# Patient Record
Sex: FEMALE | Race: ASIAN | NOT HISPANIC OR LATINO | URBAN - METROPOLITAN AREA
[De-identification: names, ages, dates, MRNs, and addresses within clinical notes are randomized per-mention and may not be internally consistent; named-entity substitution may affect disease eponyms.]

---

## 2018-10-06 ENCOUNTER — EMERGENCY (EMERGENCY)
Facility: HOSPITAL | Age: 58
LOS: 1 days | Discharge: HOME | End: 2018-10-06
Attending: EMERGENCY MEDICINE
Payer: COMMERCIAL

## 2018-10-06 VITALS
OXYGEN SATURATION: 97 % | SYSTOLIC BLOOD PRESSURE: 114 MMHG | DIASTOLIC BLOOD PRESSURE: 75 MMHG | TEMPERATURE: 99 F | RESPIRATION RATE: 17 BRPM | HEART RATE: 85 BPM | WEIGHT: 149.91 LBS | HEIGHT: 64 IN

## 2018-10-06 VITALS
HEART RATE: 68 BPM | TEMPERATURE: 99 F | RESPIRATION RATE: 17 BRPM | SYSTOLIC BLOOD PRESSURE: 99 MMHG | OXYGEN SATURATION: 98 % | DIASTOLIC BLOOD PRESSURE: 85 MMHG

## 2018-10-06 DIAGNOSIS — Z98.89 OTHER SPECIFIED POSTPROCEDURAL STATES: Chronic | ICD-10-CM

## 2018-10-06 DIAGNOSIS — Z98.51 TUBAL LIGATION STATUS: Chronic | ICD-10-CM

## 2018-10-06 LAB
ALBUMIN SERPL ELPH-MCNC: 3.6 G/DL — SIGNIFICANT CHANGE UP (ref 3.3–5)
ALP SERPL-CCNC: 77 U/L — SIGNIFICANT CHANGE UP (ref 40–120)
ALT FLD-CCNC: 46 U/L — HIGH (ref 10–45)
ANION GAP SERPL CALC-SCNC: 11 MMOL/L — SIGNIFICANT CHANGE UP (ref 5–17)
APPEARANCE UR: CLEAR — SIGNIFICANT CHANGE UP
AST SERPL-CCNC: 31 U/L — SIGNIFICANT CHANGE UP (ref 10–40)
BACTERIA # UR AUTO: NEGATIVE — SIGNIFICANT CHANGE UP
BASOPHILS # BLD AUTO: 0 K/UL — SIGNIFICANT CHANGE UP (ref 0–0.2)
BASOPHILS NFR BLD AUTO: 0.7 % — SIGNIFICANT CHANGE UP (ref 0–2)
BILIRUB SERPL-MCNC: 0.3 MG/DL — SIGNIFICANT CHANGE UP (ref 0.2–1.2)
BILIRUB UR-MCNC: NEGATIVE — SIGNIFICANT CHANGE UP
BUN SERPL-MCNC: 10 MG/DL — SIGNIFICANT CHANGE UP (ref 7–23)
CALCIUM SERPL-MCNC: 9.3 MG/DL — SIGNIFICANT CHANGE UP (ref 8.4–10.5)
CHLORIDE SERPL-SCNC: 101 MMOL/L — SIGNIFICANT CHANGE UP (ref 96–108)
CO2 SERPL-SCNC: 23 MMOL/L — SIGNIFICANT CHANGE UP (ref 22–31)
COLOR SPEC: SIGNIFICANT CHANGE UP
CREAT SERPL-MCNC: 0.84 MG/DL — SIGNIFICANT CHANGE UP (ref 0.5–1.3)
DIFF PNL FLD: NEGATIVE — SIGNIFICANT CHANGE UP
EOSINOPHIL # BLD AUTO: 0.3 K/UL — SIGNIFICANT CHANGE UP (ref 0–0.5)
EOSINOPHIL NFR BLD AUTO: 3.8 % — SIGNIFICANT CHANGE UP (ref 0–6)
EPI CELLS # UR: 2 /HPF — SIGNIFICANT CHANGE UP
GAS PNL BLDV: SIGNIFICANT CHANGE UP
GLUCOSE SERPL-MCNC: 90 MG/DL — SIGNIFICANT CHANGE UP (ref 70–99)
GLUCOSE UR QL: NEGATIVE — SIGNIFICANT CHANGE UP
HCT VFR BLD CALC: 28.2 % — LOW (ref 34.5–45)
HGB BLD-MCNC: 9.4 G/DL — LOW (ref 11.5–15.5)
HYALINE CASTS # UR AUTO: 0 /LPF — SIGNIFICANT CHANGE UP (ref 0–2)
KETONES UR-MCNC: NEGATIVE — SIGNIFICANT CHANGE UP
LEUKOCYTE ESTERASE UR-ACNC: NEGATIVE — SIGNIFICANT CHANGE UP
LYMPHOCYTES # BLD AUTO: 1.6 K/UL — SIGNIFICANT CHANGE UP (ref 1–3.3)
LYMPHOCYTES # BLD AUTO: 21.9 % — SIGNIFICANT CHANGE UP (ref 13–44)
MCHC RBC-ENTMCNC: 32.2 PG — SIGNIFICANT CHANGE UP (ref 27–34)
MCHC RBC-ENTMCNC: 33.3 GM/DL — SIGNIFICANT CHANGE UP (ref 32–36)
MCV RBC AUTO: 96.6 FL — SIGNIFICANT CHANGE UP (ref 80–100)
MONOCYTES # BLD AUTO: 0.7 K/UL — SIGNIFICANT CHANGE UP (ref 0–0.9)
MONOCYTES NFR BLD AUTO: 10.1 % — SIGNIFICANT CHANGE UP (ref 2–14)
NEUTROPHILS # BLD AUTO: 4.6 K/UL — SIGNIFICANT CHANGE UP (ref 1.8–7.4)
NEUTROPHILS NFR BLD AUTO: 63.6 % — SIGNIFICANT CHANGE UP (ref 43–77)
NITRITE UR-MCNC: NEGATIVE — SIGNIFICANT CHANGE UP
PH UR: 6.5 — SIGNIFICANT CHANGE UP (ref 5–8)
PLATELET # BLD AUTO: 398 K/UL — SIGNIFICANT CHANGE UP (ref 150–400)
POTASSIUM SERPL-MCNC: 3.9 MMOL/L — SIGNIFICANT CHANGE UP (ref 3.5–5.3)
POTASSIUM SERPL-SCNC: 3.9 MMOL/L — SIGNIFICANT CHANGE UP (ref 3.5–5.3)
PROT SERPL-MCNC: 6.9 G/DL — SIGNIFICANT CHANGE UP (ref 6–8.3)
PROT UR-MCNC: SIGNIFICANT CHANGE UP
RAPID RVP RESULT: SIGNIFICANT CHANGE UP
RBC # BLD: 2.92 M/UL — LOW (ref 3.8–5.2)
RBC # FLD: 13.4 % — SIGNIFICANT CHANGE UP (ref 10.3–14.5)
RBC CASTS # UR COMP ASSIST: 1 /HPF — SIGNIFICANT CHANGE UP (ref 0–4)
SODIUM SERPL-SCNC: 135 MMOL/L — SIGNIFICANT CHANGE UP (ref 135–145)
SP GR SPEC: 1.02 — SIGNIFICANT CHANGE UP (ref 1.01–1.02)
UROBILINOGEN FLD QL: NEGATIVE — SIGNIFICANT CHANGE UP
WBC # BLD: 7.3 K/UL — SIGNIFICANT CHANGE UP (ref 3.8–10.5)
WBC # FLD AUTO: 7.3 K/UL — SIGNIFICANT CHANGE UP (ref 3.8–10.5)
WBC UR QL: 2 /HPF — SIGNIFICANT CHANGE UP (ref 0–5)

## 2018-10-06 PROCEDURE — 71045 X-RAY EXAM CHEST 1 VIEW: CPT | Mod: 26

## 2018-10-06 PROCEDURE — 80053 COMPREHEN METABOLIC PANEL: CPT

## 2018-10-06 PROCEDURE — 84295 ASSAY OF SERUM SODIUM: CPT

## 2018-10-06 PROCEDURE — 99284 EMERGENCY DEPT VISIT MOD MDM: CPT | Mod: 25

## 2018-10-06 PROCEDURE — 87581 M.PNEUMON DNA AMP PROBE: CPT

## 2018-10-06 PROCEDURE — 82435 ASSAY OF BLOOD CHLORIDE: CPT

## 2018-10-06 PROCEDURE — 82947 ASSAY GLUCOSE BLOOD QUANT: CPT

## 2018-10-06 PROCEDURE — 87186 SC STD MICRODIL/AGAR DIL: CPT

## 2018-10-06 PROCEDURE — 99284 EMERGENCY DEPT VISIT MOD MDM: CPT

## 2018-10-06 PROCEDURE — 82330 ASSAY OF CALCIUM: CPT

## 2018-10-06 PROCEDURE — 82803 BLOOD GASES ANY COMBINATION: CPT

## 2018-10-06 PROCEDURE — 81001 URINALYSIS AUTO W/SCOPE: CPT

## 2018-10-06 PROCEDURE — 83605 ASSAY OF LACTIC ACID: CPT

## 2018-10-06 PROCEDURE — 85014 HEMATOCRIT: CPT

## 2018-10-06 PROCEDURE — 84132 ASSAY OF SERUM POTASSIUM: CPT

## 2018-10-06 PROCEDURE — 96375 TX/PRO/DX INJ NEW DRUG ADDON: CPT

## 2018-10-06 PROCEDURE — 85027 COMPLETE CBC AUTOMATED: CPT

## 2018-10-06 PROCEDURE — 96374 THER/PROPH/DIAG INJ IV PUSH: CPT

## 2018-10-06 PROCEDURE — 87633 RESP VIRUS 12-25 TARGETS: CPT

## 2018-10-06 PROCEDURE — 87086 URINE CULTURE/COLONY COUNT: CPT

## 2018-10-06 PROCEDURE — 87040 BLOOD CULTURE FOR BACTERIA: CPT

## 2018-10-06 PROCEDURE — 87486 CHLMYD PNEUM DNA AMP PROBE: CPT

## 2018-10-06 PROCEDURE — 87798 DETECT AGENT NOS DNA AMP: CPT

## 2018-10-06 PROCEDURE — 71045 X-RAY EXAM CHEST 1 VIEW: CPT

## 2018-10-06 RX ORDER — MORPHINE SULFATE 50 MG/1
4 CAPSULE, EXTENDED RELEASE ORAL ONCE
Qty: 0 | Refills: 0 | Status: DISCONTINUED | OUTPATIENT
Start: 2018-10-06 | End: 2018-10-06

## 2018-10-06 RX ORDER — METOCLOPRAMIDE HCL 10 MG
10 TABLET ORAL ONCE
Qty: 0 | Refills: 0 | Status: COMPLETED | OUTPATIENT
Start: 2018-10-06 | End: 2018-10-06

## 2018-10-06 RX ORDER — SODIUM CHLORIDE 9 MG/ML
1000 INJECTION INTRAMUSCULAR; INTRAVENOUS; SUBCUTANEOUS ONCE
Qty: 0 | Refills: 0 | Status: COMPLETED | OUTPATIENT
Start: 2018-10-06 | End: 2018-10-06

## 2018-10-06 RX ADMIN — MORPHINE SULFATE 4 MILLIGRAM(S): 50 CAPSULE, EXTENDED RELEASE ORAL at 12:57

## 2018-10-06 RX ADMIN — Medication 10 MILLIGRAM(S): at 12:46

## 2018-10-06 RX ADMIN — SODIUM CHLORIDE 1000 MILLILITER(S): 9 INJECTION INTRAMUSCULAR; INTRAVENOUS; SUBCUTANEOUS at 12:46

## 2018-10-06 NOTE — ED PROVIDER NOTE - PROGRESS NOTE DETAILS
Patient feel better.  Labs unremarkable.  No Leukocytosis, CXR and Urine normal.  Surgical site CDI, no signs of infection.  Patient afebrile here.  Will DC home to follow up with her surgeon.  -Alan You PA-C Patient feel better.  Labs unremarkable.  No Leukocytosis, CXR and Urine normal.  Surgical site CDI, no signs of infection.  Patient afebrile here.  Wants to go home. Will DC home to follow up with her surgeon.  Return precautions discussed -Alan You PA-C

## 2018-10-06 NOTE — ED PROVIDER NOTE - PLAN OF CARE
1.  Stay well hydrated  2.  Continue current home medications  3.  Follow up with your Neurosurgeon as soon as possible.  4.  Return to the ER for worsening pain, persistent fevers/chills, headache or any other concerning symptoms

## 2018-10-06 NOTE — ED PROVIDER NOTE - MEDICAL DECISION MAKING DETAILS
58 year old female post-op presents 2 days of fever and headache. No other associated symptoms. Plan: infectious workup and spine consult. Mariah Chavez MD - Attending Physician: 58 year old female post-op presents 2 days of fever and headache. No other associated symptoms. Plan: infectious workup and spine consult.

## 2018-10-06 NOTE — ED PROVIDER NOTE - NS_ ATTENDINGSCRIBEDETAILS _ED_A_ED_FT
Mariah Chavez MD - Attending Physician: The scribe's documentation has been prepared under my direction and personally reviewed by me in its entirety. I confirm that the note above accurately reflects all work, treatment, procedures, and medical decision making performed by me.

## 2018-10-06 NOTE — ED ADULT TRIAGE NOTE - CHIEF COMPLAINT QUOTE
recent fall some sutures fell out of surgical site after fall/ fevers headache back surgery on 9/26/ fevers since friday

## 2018-10-06 NOTE — ED PROVIDER NOTE - OBJECTIVE STATEMENT
59 yo female with pmhx of Hypertension and recent  L1-S1 (per pt) laminectomy last week present with 2 days of 102 fever and headache. Patient reports having fall 2 days ago where patient felt knees buckled and felt weak Took extra strength Tylenol  at 8 am this morning which brought fever down but headache is still persistent. Denies cough or chest pain. No abdominal pain + nausea no vomiting. No neck pain. Allergic to prednisone, penicillin, and latex. 57 yo female with pmhx of Hypertension and recent  L1-S1 (per pt) laminectomy last week at [facility] present with 2 days of 102 fever and headache. Patient reports having fall 2 days ago where patient felt knees buckled and felt weak. Took extra strength Tylenol at 8 am this morning which brought fever down but headache is still persistent. Denies cough or chest pain. No abdominal pain, + nausea no vomiting. No neck pain. +Generalized weakness. Did not speak with her surgeon. Allergic to prednisone, penicillin, and latex.

## 2018-10-06 NOTE — ED PROVIDER NOTE - ATTENDING CONTRIBUTION TO CARE
Mariah Chavez MD - Attending Physician: I have personally seen and examined this patient. I have discussed the case with the ACP. I have reviewed all pertinent clinical information, including history, physical exam, plan and the ACP’s note and agree except as noted. See MDM

## 2018-10-06 NOTE — ED ADULT NURSE NOTE - OBJECTIVE STATEMENT
57 yo female A&OX3 presents to the ED with the c/o head and back pain. Pt had surgery done to her back 9/26 for spinal fusion. Pt states that she felt weak and fell 2 days ago and now c/o increased pain. Pt also states that she had fevers x 3 days ranging from 102 -104 f. As per patient she has headache, hx of migraines but describes this head ache as sharp and " ten times worst than my regular headaches". No bowel or bladder incontinence also pt states that she ha chills last night. No recent sick contacts, pt states that " a few staples fell out" s/p fall. Denies n/v and diarrhea. Lungs clear, equal, b/l no sob or cough.  MAEX4

## 2018-10-09 RX ORDER — CEFPODOXIME PROXETIL 100 MG
1 TABLET ORAL
Qty: 14 | Refills: 0
Start: 2018-10-09 | End: 2018-10-15

## 2018-10-09 NOTE — ED POST DISCHARGE NOTE - DETAILS
spoke with patient, her fever has resolved however she endorses catheterization for the surgical procedure. advised we should treat and she should follow up with her surgeon - Yessica Ling PA-C

## 2018-10-11 LAB
CULTURE RESULTS: SIGNIFICANT CHANGE UP
CULTURE RESULTS: SIGNIFICANT CHANGE UP
SPECIMEN SOURCE: SIGNIFICANT CHANGE UP
SPECIMEN SOURCE: SIGNIFICANT CHANGE UP

## 2019-12-09 NOTE — ED PROVIDER NOTE - NS ED MD DISPO DISCHARGE
Home Bactrim Counseling:  I discussed with the patient the risks of sulfa antibiotics including but not limited to GI upset, allergic reaction, drug rash, diarrhea, dizziness, photosensitivity, and yeast infections.  Rarely, more serious reactions can occur including but not limited to aplastic anemia, agranulocytosis, methemoglobinemia, blood dyscrasias, liver or kidney failure, lung infiltrates or desquamative/blistering drug rashes.

## 2020-09-18 NOTE — ED ADULT TRIAGE NOTE - WEIGHT IN LBS
Called patient. Left message for patient to return call to notify of nephrology referral.     Thanks!   149.9

## 2021-04-07 ENCOUNTER — APPOINTMENT (OUTPATIENT)
Dept: DISASTER EMERGENCY | Facility: OTHER | Age: 61
End: 2021-04-07
Payer: COMMERCIAL

## 2021-04-07 PROCEDURE — 0001A: CPT

## 2021-04-28 ENCOUNTER — APPOINTMENT (OUTPATIENT)
Dept: DISASTER EMERGENCY | Facility: OTHER | Age: 61
End: 2021-04-28
Payer: COMMERCIAL

## 2021-04-28 PROCEDURE — 0002A: CPT

## 2021-07-18 RX ADMIN — Medication 25 MILLIGRAM(S): at 00:51

## 2021-07-19 ENCOUNTER — EMERGENCY (EMERGENCY)
Facility: HOSPITAL | Age: 61
LOS: 1 days | Discharge: HOME | End: 2021-07-19
Attending: EMERGENCY MEDICINE
Payer: COMMERCIAL

## 2021-07-19 VITALS
RESPIRATION RATE: 17 BRPM | DIASTOLIC BLOOD PRESSURE: 87 MMHG | SYSTOLIC BLOOD PRESSURE: 132 MMHG | TEMPERATURE: 98 F | OXYGEN SATURATION: 100 % | HEART RATE: 70 BPM

## 2021-07-19 VITALS
OXYGEN SATURATION: 98 % | WEIGHT: 154.98 LBS | SYSTOLIC BLOOD PRESSURE: 118 MMHG | HEIGHT: 64 IN | RESPIRATION RATE: 18 BRPM | HEART RATE: 84 BPM | TEMPERATURE: 98 F | DIASTOLIC BLOOD PRESSURE: 76 MMHG

## 2021-07-19 DIAGNOSIS — Z98.51 TUBAL LIGATION STATUS: Chronic | ICD-10-CM

## 2021-07-19 DIAGNOSIS — Z98.89 OTHER SPECIFIED POSTPROCEDURAL STATES: Chronic | ICD-10-CM

## 2021-07-19 LAB
ALBUMIN SERPL ELPH-MCNC: 4.2 G/DL — SIGNIFICANT CHANGE UP (ref 3.3–5)
ALP SERPL-CCNC: 65 U/L — SIGNIFICANT CHANGE UP (ref 40–120)
ALT FLD-CCNC: 36 U/L — SIGNIFICANT CHANGE UP (ref 10–45)
ANION GAP SERPL CALC-SCNC: 15 MMOL/L — SIGNIFICANT CHANGE UP (ref 5–17)
AST SERPL-CCNC: 33 U/L — SIGNIFICANT CHANGE UP (ref 10–40)
BASOPHILS # BLD AUTO: 0.03 K/UL — SIGNIFICANT CHANGE UP (ref 0–0.2)
BASOPHILS NFR BLD AUTO: 0.6 % — SIGNIFICANT CHANGE UP (ref 0–2)
BILIRUB SERPL-MCNC: 0.5 MG/DL — SIGNIFICANT CHANGE UP (ref 0.2–1.2)
BUN SERPL-MCNC: 12 MG/DL — SIGNIFICANT CHANGE UP (ref 7–23)
CALCIUM SERPL-MCNC: 9.9 MG/DL — SIGNIFICANT CHANGE UP (ref 8.4–10.5)
CHLORIDE SERPL-SCNC: 102 MMOL/L — SIGNIFICANT CHANGE UP (ref 96–108)
CO2 SERPL-SCNC: 20 MMOL/L — LOW (ref 22–31)
CREAT SERPL-MCNC: 0.69 MG/DL — SIGNIFICANT CHANGE UP (ref 0.5–1.3)
EOSINOPHIL # BLD AUTO: 0.03 K/UL — SIGNIFICANT CHANGE UP (ref 0–0.5)
EOSINOPHIL NFR BLD AUTO: 0.6 % — SIGNIFICANT CHANGE UP (ref 0–6)
ETHANOL SERPL-MCNC: 232 MG/DL — HIGH (ref 0–10)
GLUCOSE SERPL-MCNC: 88 MG/DL — SIGNIFICANT CHANGE UP (ref 70–99)
HCT VFR BLD CALC: 43.7 % — SIGNIFICANT CHANGE UP (ref 34.5–45)
HGB BLD-MCNC: 14.3 G/DL — SIGNIFICANT CHANGE UP (ref 11.5–15.5)
IMM GRANULOCYTES NFR BLD AUTO: 2.2 % — HIGH (ref 0–1.5)
LYMPHOCYTES # BLD AUTO: 1.61 K/UL — SIGNIFICANT CHANGE UP (ref 1–3.3)
LYMPHOCYTES # BLD AUTO: 34.6 % — SIGNIFICANT CHANGE UP (ref 13–44)
MCHC RBC-ENTMCNC: 32.6 PG — SIGNIFICANT CHANGE UP (ref 27–34)
MCHC RBC-ENTMCNC: 32.7 GM/DL — SIGNIFICANT CHANGE UP (ref 32–36)
MCV RBC AUTO: 99.5 FL — SIGNIFICANT CHANGE UP (ref 80–100)
MONOCYTES # BLD AUTO: 0.4 K/UL — SIGNIFICANT CHANGE UP (ref 0–0.9)
MONOCYTES NFR BLD AUTO: 8.6 % — SIGNIFICANT CHANGE UP (ref 2–14)
NEUTROPHILS # BLD AUTO: 2.48 K/UL — SIGNIFICANT CHANGE UP (ref 1.8–7.4)
NEUTROPHILS NFR BLD AUTO: 53.4 % — SIGNIFICANT CHANGE UP (ref 43–77)
NRBC # BLD: 0 /100 WBCS — SIGNIFICANT CHANGE UP (ref 0–0)
PLATELET # BLD AUTO: 203 K/UL — SIGNIFICANT CHANGE UP (ref 150–400)
POTASSIUM SERPL-MCNC: 3.7 MMOL/L — SIGNIFICANT CHANGE UP (ref 3.5–5.3)
POTASSIUM SERPL-SCNC: 3.7 MMOL/L — SIGNIFICANT CHANGE UP (ref 3.5–5.3)
PROT SERPL-MCNC: 7.6 G/DL — SIGNIFICANT CHANGE UP (ref 6–8.3)
RBC # BLD: 4.39 M/UL — SIGNIFICANT CHANGE UP (ref 3.8–5.2)
RBC # FLD: 12.6 % — SIGNIFICANT CHANGE UP (ref 10.3–14.5)
SARS-COV-2 RNA SPEC QL NAA+PROBE: SIGNIFICANT CHANGE UP
SODIUM SERPL-SCNC: 137 MMOL/L — SIGNIFICANT CHANGE UP (ref 135–145)
WBC # BLD: 4.65 K/UL — SIGNIFICANT CHANGE UP (ref 3.8–10.5)
WBC # FLD AUTO: 4.65 K/UL — SIGNIFICANT CHANGE UP (ref 3.8–10.5)

## 2021-07-19 PROCEDURE — 94640 AIRWAY INHALATION TREATMENT: CPT

## 2021-07-19 PROCEDURE — 99234 HOSP IP/OBS SM DT SF/LOW 45: CPT

## 2021-07-19 PROCEDURE — 96374 THER/PROPH/DIAG INJ IV PUSH: CPT

## 2021-07-19 PROCEDURE — G0378: CPT

## 2021-07-19 PROCEDURE — 96372 THER/PROPH/DIAG INJ SC/IM: CPT | Mod: XU

## 2021-07-19 PROCEDURE — 85025 COMPLETE CBC W/AUTO DIFF WBC: CPT

## 2021-07-19 PROCEDURE — 80307 DRUG TEST PRSMV CHEM ANLYZR: CPT

## 2021-07-19 PROCEDURE — 96375 TX/PRO/DX INJ NEW DRUG ADDON: CPT

## 2021-07-19 PROCEDURE — 93005 ELECTROCARDIOGRAM TRACING: CPT

## 2021-07-19 PROCEDURE — 87635 SARS-COV-2 COVID-19 AMP PRB: CPT

## 2021-07-19 PROCEDURE — 99291 CRITICAL CARE FIRST HOUR: CPT | Mod: 25

## 2021-07-19 PROCEDURE — 93010 ELECTROCARDIOGRAM REPORT: CPT

## 2021-07-19 PROCEDURE — 80053 COMPREHEN METABOLIC PANEL: CPT

## 2021-07-19 RX ORDER — FAMOTIDINE 10 MG/ML
20 INJECTION INTRAVENOUS ONCE
Refills: 0 | Status: COMPLETED | OUTPATIENT
Start: 2021-07-19 | End: 2021-07-19

## 2021-07-19 RX ORDER — IPRATROPIUM/ALBUTEROL SULFATE 18-103MCG
3 AEROSOL WITH ADAPTER (GRAM) INHALATION EVERY 6 HOURS
Refills: 0 | Status: DISCONTINUED | OUTPATIENT
Start: 2021-07-19 | End: 2021-07-22

## 2021-07-19 RX ORDER — ONDANSETRON 8 MG/1
4 TABLET, FILM COATED ORAL ONCE
Refills: 0 | Status: DISCONTINUED | OUTPATIENT
Start: 2021-07-19 | End: 2021-07-22

## 2021-07-19 RX ORDER — EPINEPHRINE 0.3 MG/.3ML
0.3 INJECTION INTRAMUSCULAR; SUBCUTANEOUS ONCE
Refills: 0 | Status: COMPLETED | OUTPATIENT
Start: 2021-07-19 | End: 2021-07-19

## 2021-07-19 RX ORDER — SODIUM CHLORIDE 9 MG/ML
1000 INJECTION INTRAMUSCULAR; INTRAVENOUS; SUBCUTANEOUS ONCE
Refills: 0 | Status: COMPLETED | OUTPATIENT
Start: 2021-07-19 | End: 2021-07-19

## 2021-07-19 RX ORDER — FAMOTIDINE 10 MG/ML
20 INJECTION INTRAVENOUS
Refills: 0 | Status: DISCONTINUED | OUTPATIENT
Start: 2021-07-19 | End: 2021-07-22

## 2021-07-19 RX ORDER — EPINEPHRINE 0.3 MG/.3ML
1 INJECTION INTRAMUSCULAR; SUBCUTANEOUS
Qty: 1 | Refills: 0
Start: 2021-07-19

## 2021-07-19 RX ORDER — DIPHENHYDRAMINE HCL 50 MG
25 CAPSULE ORAL ONCE
Refills: 0 | Status: COMPLETED | OUTPATIENT
Start: 2021-07-19 | End: 2021-07-18

## 2021-07-19 RX ORDER — DIPHENHYDRAMINE HCL 50 MG
50 CAPSULE ORAL ONCE
Refills: 0 | Status: COMPLETED | OUTPATIENT
Start: 2021-07-19 | End: 2021-07-19

## 2021-07-19 RX ORDER — ALBUTEROL 90 UG/1
2 AEROSOL, METERED ORAL EVERY 6 HOURS
Refills: 0 | Status: DISCONTINUED | OUTPATIENT
Start: 2021-07-19 | End: 2021-07-22

## 2021-07-19 RX ADMIN — EPINEPHRINE 0.3 MILLIGRAM(S): 0.3 INJECTION INTRAMUSCULAR; SUBCUTANEOUS at 00:49

## 2021-07-19 RX ADMIN — Medication 50 MILLIGRAM(S): at 12:39

## 2021-07-19 RX ADMIN — FAMOTIDINE 20 MILLIGRAM(S): 10 INJECTION INTRAVENOUS at 00:51

## 2021-07-19 RX ADMIN — Medication 3 MILLILITER(S): at 12:56

## 2021-07-19 RX ADMIN — SODIUM CHLORIDE 1000 MILLILITER(S): 9 INJECTION INTRAMUSCULAR; INTRAVENOUS; SUBCUTANEOUS at 02:10

## 2021-07-19 RX ADMIN — Medication 100 MILLIGRAM(S): at 12:40

## 2021-07-19 RX ADMIN — Medication 3 MILLILITER(S): at 00:42

## 2021-07-19 NOTE — ED CDU PROVIDER INITIAL DAY NOTE - DETAILS
- observation status and frequent re-evaluation  - IVF, Benadryl 25mg IV Q6H, Pepcid 20mg IV Q12H, Prednisone 50mg PO in AM  - continuous telemetry, pulse oximetry  - d/c with RX for Epipen, Prednisone  - plan d/w Geovany - observation status and frequent re-evaluation  - IVF, Benadryl 25mg IV Q6H, Pepcid 20mg IV Q12H,   - continuous telemetry, pulse oximetry  - d/c with RX for Epipen  - plan d/w Geovany

## 2021-07-19 NOTE — ED CDU PROVIDER INITIAL DAY NOTE - ATTENDING CONTRIBUTION TO CARE
***Torito Armenta MD***  I have personally performed a face to face diagnostic evaluation on this patient.  I have reviewed the ACP note and agree with the history, exam, and plan of care, except as noted. Patient will be reassessed and discussed with AM/PM CDU/FT MD who will follow up on labs, analgesia, imaging and disposition as clinically indicated. Please see emergency department provider note.

## 2021-07-19 NOTE — ED PROVIDER NOTE - PROGRESS NOTE DETAILS
Resident Alexa: interval assessment with improvement in respiratory status, improved air entry, extinguished wheezes B/L, RR now 16. Will continue to monitor. CDU paged for likely dispo to obs.

## 2021-07-19 NOTE — ED PROVIDER NOTE - PHYSICAL EXAMINATION
GEN - Respiratory Distress; A+Ox3,   HENT - NC/AT, No visible Ecchymosis, Uvula Midline, No Oropharyngeal Erythema; No Abrasions, No Lac/Tears, MMM, no discharge  EYES - (+) Periorbital Swelling/Erythema; EOMI, PERRL, no conjunctival pallor, no scleral icterus  NECK - Neck supple, No LAD, No Swelling  PULM - Wheezing Diffusely B/L; Poor Air Entry; symmetric breath sounds  CV -  RRR, S1 S2, no murmurs 2+ Pulses B/L UE  GI - (-) Serrano's, (-) Rovsings, (-) McBurneys; NT/ND, soft, no guarding, no rebound, no masses    MSK/EXT- no edema, no gross deformity, warm and well perfused, no calf tenderness/swelling/erythema   SKIN - No rashes, no erythematous lesions  NEUROLOGIC - alert, moving all 4 ext with 5/5 Strength

## 2021-07-19 NOTE — ED CDU PROVIDER DISPOSITION NOTE - NSFOLLOWUPINSTRUCTIONS_ED_ALL_ED_FT
(1) You will need to follow-up with _________ in 2-3 days. A copy of your results were given to you to bring to your appt.  (2) Read attached discharge paperwork.  (3) Continue your home medications along with: Claritin 10mg once daily, Pepcid 20mg twice daily, EpiPen   (4) Drink plenty of fluids to stay hydrated.  (5) Return to ER for trouble breathing, chest pain, sensation throat is closing, lightheaded/dizziness or any other concerns. 1) Follow-up with your primary care provider in 1-2 days.      Follow-up with Psychiatry within 1-2 weeks    [facility]  Psychiatry  [address] [place] [address]  Phone: [phone]    2) Epipens were sent to your pharmacy. Take benadryl as instructed for allergic reaction. Also take pepcid  daily. Continue to take all medications as prescribed.    3) Rest and drink plenty of fluids. Pain can be managed with Acetaminophen (aka Tylenol) and Ibuprofen (aka Motrin or Advil) over the counter as directed. Take with food.    4) Return to the ER for any new or worsening symptoms.

## 2021-07-19 NOTE — ED CDU PROVIDER DISPOSITION NOTE - PATIENT PORTAL LINK FT
You can access the FollowMyHealth Patient Portal offered by [facility] by registering at the following website: [email or web address]. By joining [facility]’s FollowMyHealth portal, you will also be able to view your health information using other applications (apps) compatible with our system.

## 2021-07-19 NOTE — ED CDU PROVIDER DISPOSITION NOTE - CLINICAL COURSE
· HPI Objective Statement: 61 yof, Hx: multiple allergic reactions; presents from a bar while she was out celebrating with family when all of a sudden her eyes became swollen, she developed acute SOB, wheezing, and felt like she couldn't breathe. Pt immediately placed onto cardiac monitor, breath sounds with diffuse wheezing, poor air exchange, RR: 30's. MD called to bedside, IV access obtained, Epi 0.3 mg IM initiated, with rapid improvement in respiratory status and air exchange. Reports she had 3 drinks, but unclear if she had any allergic exposures. Denies any other toxic co-ingestants.  In ED, patient received multiple neb treatments, epi, benadryl and pepcid w/ improvement in symptoms. Pt sent to CDU for frequent reeval, vitals q 4hrs, tele monitoring, symptomatic tx and observation · HPI Objective Statement: 61 yof, Hx: multiple allergic reactions; presents from a bar while she was out celebrating with family when all of a sudden her eyes became swollen, she developed acute SOB, wheezing, and felt like she couldn't breathe. Pt immediately placed onto cardiac monitor, breath sounds with diffuse wheezing, poor air exchange, RR: 30's. MD called to bedside, IV access obtained, Epi 0.3 mg IM initiated, with rapid improvement in respiratory status and air exchange. Reports she had 3 drinks, but unclear if she had any allergic exposures. Denies any other toxic co-ingestants.  In ED, patient received multiple neb treatments, epi, benadryl and pepcid w/ improvement in symptoms. Pt sent to CDU for frequent reeval, vitals q 4hrs, tele monitoring, symptomatic tx and observation  In CDU, no everts on tele, clinically sober, feeling better resting comfortably w/o complaint. Speaking in clear complete sentences. Normal resp, no wheezing. · HPI Objective Statement: 61 yof, Hx: multiple allergic reactions; presents from a bar while she was out celebrating with family when all of a sudden her eyes became swollen, she developed acute SOB, wheezing, and felt like she couldn't breathe. Pt immediately placed onto cardiac monitor, breath sounds with diffuse wheezing, poor air exchange, RR: 30's. MD called to bedside, IV access obtained, Epi 0.3 mg IM initiated, with rapid improvement in respiratory status and air exchange. Reports she had 3 drinks, but unclear if she had any allergic exposures. Denies any other toxic co-ingestants.  In ED, patient received multiple neb treatments, epi, benadryl and pepcid w/ improvement in symptoms. Pt sent to CDU for frequent reeval, vitals q 4hrs, tele monitoring, symptomatic tx and observation  In CDU, VSS, no events on tele, clinically sober no signs of withdrawal, feeling better resting comfortably w/o complaint. Speaking in clear complete sentences. Normal resp, no wheezing. Stable for DC. Epipen sent to confirmed pharmacy. No SI/HI, psych contact info requested by pt and family. Pt understands to f/u w/ PMD.

## 2021-07-19 NOTE — ED PROVIDER NOTE - OBJECTIVE STATEMENT
61 yof, Hx: multiple allergic reactions; presents from a bar while she was out celebrating with family when all of a sudden her eyes became swollen, she developed acute SOB, wheezing, and felt like she couldn't breathe. Pt immediately placed onto cardiac monitor, breath sounds with diffuse wheezing, poor air exchange, RR: 30's. MD called to bedside, IV access obtained, Epi 0.3 mg IM initiated, with rapid improvement in respiratory status and air exchange. Reports she has 2 drinks, but unclear if she had any allergic exposures. Denies any other toxic co-ingestants.

## 2021-07-19 NOTE — ED CDU PROVIDER DISPOSITION NOTE - ATTENDING CONTRIBUTION TO CARE
**CDU ATTENDING ADDENDUM (Dr. Francisco Valderrama): I attest that I have directly examined this patient and reviewed and formulated the diagnostic and therapeutic management plan in collaboration with the advanced practitioner (NP, PA). Please see MDM note and remainder of EMR for findings from CC, HPI, ROS, and PE. **CDU ATTENDING ADDENDUM (Dr. Francisco Valderrama): I attest that I have directly examined this patient and reviewed and formulated the diagnostic and therapeutic management plan in collaboration with the advanced practitioner (NP, PA). Please see MDM note and remainder of EMR for findings from CC, HPI, ROS, and PE. (Arcenio)

## 2021-07-19 NOTE — ED PROVIDER NOTE - ATTENDING CONTRIBUTION TO CARE
See MDM above.  patient shortness of breath and wheezing with throat tightening  will give epi, pepcid, benadryl and monitor closely and observe for 6 hours for rebound reaction  Patient endorsed to the observation PA at the time of observation order placement.  Based on patient's history and physical exam, as well as the results of today's workup, I feel that patient warrants observation in the hospital for further workup/evaluation and continued management. I discussed the findings of today's workup with the patient and addressed the patient's questions and concerns. The patient was agreeable with observation. Our team spoke with the CDU receiving team who accepted the patient for observation and subsequently took over the patient's care at the time of order placement for observation.

## 2021-07-19 NOTE — ED CDU PROVIDER DISPOSITION NOTE - LAB INTERPRETATION
**CDU ATTENDING ADDENDUM (Dr. Francisco Valderrama): Labs reviewed and analyzed. Pertinent findings include: POSITIVE elevated serum ethanol. NO profound or severe electrolyte-metabolic-endocrine derangements, anemia, or leukocytosis.

## 2021-07-19 NOTE — ED ADULT NURSE NOTE - NSIMPLEMENTINTERV_GEN_ALL_ED
Implemented All Universal Safety Interventions:  [place] to call system. Call bell, personal items and telephone within reach. Instruct patient to call for assistance. Room bathroom lighting operational. Non-slip footwear when patient is off stretcher. Physically safe environment: no spills, clutter or unnecessary equipment. Stretcher in lowest position, wheels locked, appropriate side rails in place.

## 2021-07-19 NOTE — ED PROVIDER NOTE - CLINICAL SUMMARY MEDICAL DECISION MAKING FREE TEXT BOX
61 yof, Hx: multiple allergic reactions; presents from a bar while she was out celebrating with family when all of a sudden her eyes became swollen, she developed acute SOB, wheezing, and felt like she couldn't breathe. Pt immediately placed onto cardiac monitor, breath sounds with diffuse wheezing, poor air exchange, RR: 30's. MD called to bedside, IV access obtained, Epi 0.3 mg IM initiated, with rapid improvement in respiratory status and air exchange. Reports she has 2 drinks, but unclear if she had any allergic exposures. Denies any other toxic co-ingestants. Plan: CXR, Pepcid, Benadryl, pt allergic (anaphylactic as per ) to steroids, will defer at this time. CDU for obs.

## 2021-07-19 NOTE — ED ADULT NURSE REASSESSMENT NOTE - NS ED NURSE REASSESS COMMENT FT1
Report taken from Yadi GARCIA. states pt have a good night with no complaints. Will continue to monitor.
Pt received from RADHA Hendrix. Pt oriented to CDU & plan of care was discussed. Pt A&O x 4. Pt in CDU for frequent reeval, vitals q 4hrs, tele monitoring, symptomatic tx and observation. Pt very lethargic, able to follow commands, pt states she is very tired. Pt denies any difficulty breathing, difficulty swallowing, or SOB. Pt on a cardiac monitor in NSR, HR in 80's. Pt on 2L of O2 sating in 96-98%. V/S stable, pt afebrile,  IV in place, patent and free of signs of infiltration. Pt resting in bed. Safety & comfort measures maintained. Call bell in reach. Will continue to monitor.

## 2021-07-19 NOTE — ED CDU PROVIDER INITIAL DAY NOTE - PROGRESS NOTE DETAILS
CDU PROGRESS NOTE CINDY ZHONG: Pt resting comfortably, without complaint. NAD, VSS. No events on telemetry. Labs significant for serum ETOH 232. Pt clinically sober at present time. Will continue to monitor. **CDU ATTENDING ADDENDUM (Dr. Francisco Valderrama): EMR reviewed. CDU diagnostics and consultation(s) (if applicable) up to this time have been reviewed, acknowledged and noted. Will personally assess and continue to observe and monitor closely. Pt feeling much better, clinically sober. No events on tele. S/w pts daughter Calista Oh who is [employer] RN who reposts pt occasionally drinks too much when out with friends/family and makes suicidal comments such as "I want to kill myself" and "I wish I were dead" but has never expressed having a plan. Pt denies SI/HI, plan of harm stating "its all talk." Has never seen psychiatry, info for f/u requested by pt and daughter. Epipen sent to pharmacy. Pt feeling much better, clinically sober. No events on tele. Resting comfortably w/o complaint. Speaking in clear complete sentences. Normal resp, no wheezing. S/w pts daughter Calista Oh who is [employer] RN who reposts pt occasionally drinks too much when out with friends/family and makes suicidal comments such as "I want to kill myself" and "I wish I were dead" but has never expressed having a plan. Pt denies SI/HI, plan of harm stating "its all talk." Has never seen psychiatry, info for f/u requested by pt and daughter. Epipen sent to pharmacy.

## 2021-07-19 NOTE — ED CDU PROVIDER DISPOSITION NOTE - NSFOLLOWUPCLINICS_GEN_ALL_ED_FT
[facility]  Psychiatry  [address] [place] [address]  Phone: [phone]  Fax:

## 2021-07-19 NOTE — ED PROVIDER NOTE - UNABLE TO OBTAIN
Anaphylactic Reaction Recommended artificial tears to use: 1 drop 4x a day in both eyes. Allergic Reaction Urgent need for Intervention Severe Illness/Injury

## 2021-07-19 NOTE — ED CDU PROVIDER INITIAL DAY NOTE - OBJECTIVE STATEMENT
61 yof, Hx: multiple allergic reactions; presents from a bar while she was out celebrating with family when all of a sudden her eyes became swollen, she developed acute SOB, wheezing, and felt like she couldn't breathe. Pt immediately placed onto cardiac monitor, breath sounds with diffuse wheezing, poor air exchange, RR: 30's. MD called to bedside, IV access obtained, Epi 0.3 mg IM initiated, with rapid improvement in respiratory status and air exchange. Reports she had 3 drinks, but unclear if she had any allergic exposures. Denies any other toxic co-ingestants.  In ED, patient received multiple neb treatments, epi, benadryl and pepcid w/ improvement in symptoms. Pt sent to CDU for frequent reeval, vitals q 4hrs, tele monitoring, symptomatic tx and observation

## 2021-07-19 NOTE — ED CDU PROVIDER DISPOSITION NOTE - PLAN OF CARE
**CDU ATTENDING ADDENDUM (Dr. Francisco Valderrama): Goals of care include resolution of emergent/urgent symptoms and concerns, and restoration to baseline level of homeostasis.

## 2022-06-29 NOTE — ED ADULT NURSE NOTE - NEURO WDL
Alert and oriented to person, place and time, memory intact, behavior appropriate to situation, PERRL. Arava Counseling:  Patient counseled regarding adverse effects of Arava including but not limited to nausea, vomiting, abnormalities in liver function tests. Patients may develop mouth sores, rash, diarrhea, and abnormalities in blood counts. The patient understands that monitoring is required including LFTs and blood counts.  There is a rare possibility of scarring of the liver and lung problems that can occur when taking methotrexate. Persistent nausea, loss of appetite, pale stools, dark urine, cough, and shortness of breath should be reported immediately. Patient advised to discontinue Arava treatment and consult with a physician prior to attempting conception. The patient will have to undergo a treatment to eliminate Arava from the body prior to conception.

## 2022-07-04 NOTE — ED CDU PROVIDER INITIAL DAY NOTE - NO PERTINENT FAMILY HISTORY IN FIRST DEGREE RELATIVES OF:
POD # 19 s/p diagnostic laparoscopy, ventral hernia repair - converted to open for repair of enterotomy    Overnight events: Pt c/o epigastric pain, dull/achy, non radiating 8/10 pain. Denied nausea/difficulty breathing or substernal chest pain.    SUBJECTIVE: Pt seen and examined at bedside.       OBJECTIVE:  Vital Signs Last 24 Hrs  T(C): 37.2 (04 Jul 2022 01:50), Max: 37.2 (04 Jul 2022 01:50)  T(F): 99 (04 Jul 2022 01:50), Max: 99 (04 Jul 2022 01:50)  HR: 85 (04 Jul 2022 01:50) (81 - 94)  BP: 124/79 (04 Jul 2022 01:50) (118/76 - 128/80)  BP(mean): --  RR: 17 (04 Jul 2022 01:50) (17 - 18)  SpO2: 96% (04 Jul 2022 01:50) (96% - 98%)      07-02-22 @ 07:01  -  07-03-22 @ 07:00  --------------------------------------------------------  IN: 0 mL / OUT: 1150 mL / NET: -1150 mL    07-03-22 @ 07:01  -  07-04-22 @ 03:32  --------------------------------------------------------  IN: 0 mL / OUT: 1200 mL / NET: -1200 mL        Physical Examination:    General: Appears well, NAD  CHEST: breathing comfortably  CV: appears well perfused  Abdomen: soft, nondistended, non tender, ostomy with stool, wound vac c/d/i  Extremities: Grossly symmetric  LABS:                        12.4   10.96 )-----------( 429      ( 03 Jul 2022 06:27 )             37.3       07-03    132<L>  |  91<L>  |  30<H>  ----------------------------<  102<H>  3.9   |  24  |  1.02    Ca    9.2      03 Jul 2022 06:27  Phos  4.1     07-03  Mg     2.00     07-03           POD # 19 s/p diagnostic laparoscopy, ventral hernia repair - converted to open for repair of enterotomy  POD# 13 s/p RTOR exploratory laparotomy, small bowel resection and end ileostomy    Overnight events: Pt c/o epigastric pain, dull/achy, non radiating 8/10 pain. Denied nausea/difficulty breathing or substernal chest pain. transioned to NPO. Chest-xray was not concerning.    SUBJECTIVE: Pt seen and examined at bedside.       OBJECTIVE:  Vital Signs Last 24 Hrs  T(C): 37.2 (04 Jul 2022 01:50), Max: 37.2 (04 Jul 2022 01:50)  T(F): 99 (04 Jul 2022 01:50), Max: 99 (04 Jul 2022 01:50)  HR: 85 (04 Jul 2022 01:50) (81 - 94)  BP: 124/79 (04 Jul 2022 01:50) (118/76 - 128/80)  RR: 17 (04 Jul 2022 01:50) (17 - 18)  SpO2: 96% (04 Jul 2022 01:50) (96% - 98%)      I&O's Detail    03 Jul 2022 07:01  -  04 Jul 2022 07:00  --------------------------------------------------------  IN:  Total IN: 0 mL    OUT:    Stool (mL): 700 mL    VAC (Vacuum Assisted Closure) System (mL): 175 mL    Voided (mL): 500 mL  Total OUT: 1375 mL    Total NET: -1375 mL      04 Jul 2022 07:01  -  04 Jul 2022 15:01  --------------------------------------------------------  IN:    Lactated Ringers: 1250 mL  Total IN: 1250 mL    OUT:    Blood Loss (mL): 0 mL    IV PiggyBack: 0 mL    Oral Fluid: 0 mL    Stool (mL): 100 mL    VAC (Vacuum Assisted Closure) System (mL): 0 mL    Voided (mL): 150 mL  Total OUT: 250 mL    Total NET: 1000 mL              Physical Examination:    General: Appears well, NAD  CHEST: breathing comfortably  CV: appears well perfused  Abdomen: soft, nondistended, non tender, ostomy with stool, wound vac c/d/i  Extremities: Grossly symmetric  LABS:                        12.4   10.96 )-----------( 429      ( 03 Jul 2022 06:27 )             37.3       07-03    132<L>  |  91<L>  |  30<H>  ----------------------------<  102<H>  3.9   |  24  |  1.02    Ca    9.2      03 Jul 2022 06:27  Phos  4.1     07-03  Mg     2.00     07-03         No pertinent family history in first degree relatives

## 2022-08-30 ENCOUNTER — NON-APPOINTMENT (OUTPATIENT)
Age: 62
End: 2022-08-30

## 2022-09-01 ENCOUNTER — APPOINTMENT (OUTPATIENT)
Dept: SURGERY | Facility: CLINIC | Age: 62
End: 2022-09-01

## 2022-09-01 ENCOUNTER — NON-APPOINTMENT (OUTPATIENT)
Age: 62
End: 2022-09-01

## 2022-09-01 ENCOUNTER — TRANSCRIPTION ENCOUNTER (OUTPATIENT)
Age: 62
End: 2022-09-01

## 2022-09-01 VITALS
WEIGHT: 158 LBS | HEART RATE: 76 BPM | BODY MASS INDEX: 28 KG/M2 | SYSTOLIC BLOOD PRESSURE: 132 MMHG | DIASTOLIC BLOOD PRESSURE: 81 MMHG | HEIGHT: 63 IN

## 2022-09-01 DIAGNOSIS — Z80.3 FAMILY HISTORY OF MALIGNANT NEOPLASM OF BREAST: ICD-10-CM

## 2022-09-01 DIAGNOSIS — Z86.39 PERSONAL HISTORY OF OTHER ENDOCRINE, NUTRITIONAL AND METABOLIC DISEASE: ICD-10-CM

## 2022-09-01 DIAGNOSIS — Z87.39 PERSONAL HISTORY OF OTHER DISEASES OF THE MUSCULOSKELETAL SYSTEM AND CONNECTIVE TISSUE: ICD-10-CM

## 2022-09-01 DIAGNOSIS — F17.210 NICOTINE DEPENDENCE, CIGARETTES, UNCOMPLICATED: ICD-10-CM

## 2022-09-01 DIAGNOSIS — Z78.9 OTHER SPECIFIED HEALTH STATUS: ICD-10-CM

## 2022-09-01 DIAGNOSIS — Z80.0 FAMILY HISTORY OF MALIGNANT NEOPLASM OF DIGESTIVE ORGANS: ICD-10-CM

## 2022-09-01 DIAGNOSIS — Z86.79 PERSONAL HISTORY OF OTHER DISEASES OF THE CIRCULATORY SYSTEM: ICD-10-CM

## 2022-09-01 PROCEDURE — 99204 OFFICE O/P NEW MOD 45 MIN: CPT

## 2022-09-01 RX ORDER — DESLORATADINE 5 MG/1
5 TABLET ORAL
Refills: 0 | Status: ACTIVE | COMMUNITY

## 2022-09-01 RX ORDER — PANTOPRAZOLE 40 MG/1
40 TABLET, DELAYED RELEASE ORAL
Refills: 0 | Status: ACTIVE | COMMUNITY

## 2022-09-01 RX ORDER — HYDROCHLOROTHIAZIDE 25 MG/1
25 TABLET ORAL
Refills: 0 | Status: ACTIVE | COMMUNITY

## 2022-09-01 RX ORDER — AMLODIPINE BESYLATE 5 MG/1
5 TABLET ORAL
Refills: 0 | Status: ACTIVE | COMMUNITY

## 2022-09-01 RX ORDER — GABAPENTIN 300 MG/1
300 CAPSULE ORAL
Refills: 0 | Status: ACTIVE | COMMUNITY

## 2022-09-01 RX ORDER — OLMESARTAN MEDOXOMIL 40 MG/1
40 TABLET, FILM COATED ORAL
Refills: 0 | Status: ACTIVE | COMMUNITY

## 2022-09-01 RX ORDER — MONTELUKAST SODIUM 10 MG/1
10 TABLET, FILM COATED ORAL
Refills: 0 | Status: ACTIVE | COMMUNITY

## 2022-09-01 RX ORDER — ROSUVASTATIN CALCIUM 5 MG/1
5 TABLET, FILM COATED ORAL
Refills: 0 | Status: ACTIVE | COMMUNITY

## 2022-09-01 NOTE — ASSESSMENT
[FreeTextEntry1] : lengthy discussion regarding options for management. in view of labs and symptoms have recommended minimally invasive parathyroidectomy with PTH assay.  will require preop 4D CT and thyroid sonogram. risks, benefits and alternatives discussed at length.  I have discussed with the patient the anatomy of the area, the pathophysiology of the disease process and the rationale for surgery.  The attendant risks, possible complications and expected postoperative course have been discussed in detail.  I have given the patient the opportunity to ask questions, and all questions have been answered to the patient's satisfaction, and they wish to proceed with the planned procedure.  to be scheduled ambulatory at [facility].  will require intraoperative extremity monitoring due to cervical disk disease.

## 2022-09-01 NOTE — CONSULT LETTER
[Dear  ___] : Dear  [unfilled], [Consult Letter:] : I had the pleasure of evaluating your patient, [unfilled]. [Please see my note below.] : Please see my note below. [Consult Closing:] : Thank you very much for allowing me to participate in the care of this patient.  If you have any questions, please do not hesitate to contact me. [Sincerely,] : Sincerely, [FreeTextEntry2] : Dr. Kinsey Abbasi, Dr. Ismael Santana [FreeTextEntry3] : Olu Jones MD, FACS\par System Director, Endocrine Surgery\par [facility]\par Associate  Professor of Surgery\par [employer] [facility]  [DrLeeann  ___] : Dr. CARTER

## 2022-09-01 NOTE — HISTORY OF PRESENT ILLNESS
[de-identified] : Pt c/o elevated calcium over last year,  bone pain, fatigue and constipation.  denies dysphagia, hoarseness, SOB or RT exposure. \par Ca 10.8,   PTH 86,   Vitamin D 27\par TSH 1.79\par bone density: osteopenia\par I have reviewed all old and new data and available images.\par

## 2022-09-01 NOTE — PHYSICAL EXAM
[de-identified] : thyroid nodularity [Laryngoscopy Performed] : laryngoscopy was performed, see procedure section for findings [Midline] : located in midline position [Normal] : orientation to person, place, and time: normal [de-identified] : indirect  laryngoscopy shows normal vocal cord mobility bilaterally with no lesions noted

## 2022-10-05 ENCOUNTER — APPOINTMENT (OUTPATIENT)
Dept: ULTRASOUND IMAGING | Facility: IMAGING CENTER | Age: 62
End: 2022-10-05

## 2022-10-05 ENCOUNTER — OUTPATIENT (OUTPATIENT)
Dept: OUTPATIENT SERVICES | Facility: HOSPITAL | Age: 62
LOS: 1 days | End: 2022-10-05
Payer: COMMERCIAL

## 2022-10-05 ENCOUNTER — APPOINTMENT (OUTPATIENT)
Dept: CT IMAGING | Facility: IMAGING CENTER | Age: 62
End: 2022-10-05

## 2022-10-05 DIAGNOSIS — Z98.89 OTHER SPECIFIED POSTPROCEDURAL STATES: Chronic | ICD-10-CM

## 2022-10-05 DIAGNOSIS — Z98.51 TUBAL LIGATION STATUS: Chronic | ICD-10-CM

## 2022-10-05 DIAGNOSIS — E21.0 PRIMARY HYPERPARATHYROIDISM: ICD-10-CM

## 2022-10-05 PROCEDURE — 70492 CT SFT TSUE NCK W/O & W/DYE: CPT

## 2022-10-05 PROCEDURE — 70491 CT SOFT TISSUE NECK W/DYE: CPT | Mod: 26

## 2022-10-05 PROCEDURE — 76536 US EXAM OF HEAD AND NECK: CPT | Mod: 26

## 2022-10-05 PROCEDURE — 76536 US EXAM OF HEAD AND NECK: CPT

## 2022-10-17 ENCOUNTER — NON-APPOINTMENT (OUTPATIENT)
Age: 62
End: 2022-10-17

## 2022-10-19 ENCOUNTER — OUTPATIENT (OUTPATIENT)
Dept: OUTPATIENT SERVICES | Facility: HOSPITAL | Age: 62
LOS: 1 days | End: 2022-10-19

## 2022-10-19 VITALS
DIASTOLIC BLOOD PRESSURE: 88 MMHG | WEIGHT: 162.04 LBS | SYSTOLIC BLOOD PRESSURE: 120 MMHG | OXYGEN SATURATION: 98 % | RESPIRATION RATE: 16 BRPM | HEIGHT: 63 IN | TEMPERATURE: 98 F | HEART RATE: 68 BPM

## 2022-10-19 DIAGNOSIS — I10 ESSENTIAL (PRIMARY) HYPERTENSION: ICD-10-CM

## 2022-10-19 DIAGNOSIS — Z98.51 TUBAL LIGATION STATUS: Chronic | ICD-10-CM

## 2022-10-19 DIAGNOSIS — J45.909 UNSPECIFIED ASTHMA, UNCOMPLICATED: ICD-10-CM

## 2022-10-19 DIAGNOSIS — R06.83 SNORING: ICD-10-CM

## 2022-10-19 DIAGNOSIS — E21.0 PRIMARY HYPERPARATHYROIDISM: ICD-10-CM

## 2022-10-19 DIAGNOSIS — Z98.89 OTHER SPECIFIED POSTPROCEDURAL STATES: Chronic | ICD-10-CM

## 2022-10-19 LAB
ANION GAP SERPL CALC-SCNC: 13 MMOL/L — SIGNIFICANT CHANGE UP (ref 7–14)
BUN SERPL-MCNC: 17 MG/DL — SIGNIFICANT CHANGE UP (ref 7–23)
CALCIUM SERPL-MCNC: 10.8 MG/DL — HIGH (ref 8.4–10.5)
CHLORIDE SERPL-SCNC: 102 MMOL/L — SIGNIFICANT CHANGE UP (ref 98–107)
CO2 SERPL-SCNC: 25 MMOL/L — SIGNIFICANT CHANGE UP (ref 22–31)
CREAT SERPL-MCNC: 0.68 MG/DL — SIGNIFICANT CHANGE UP (ref 0.5–1.3)
EGFR: 99 ML/MIN/1.73M2 — SIGNIFICANT CHANGE UP
GLUCOSE SERPL-MCNC: 86 MG/DL — SIGNIFICANT CHANGE UP (ref 70–99)
HCT VFR BLD CALC: 43.1 % — SIGNIFICANT CHANGE UP (ref 34.5–45)
HGB BLD-MCNC: 14.4 G/DL — SIGNIFICANT CHANGE UP (ref 11.5–15.5)
MCHC RBC-ENTMCNC: 32.3 PG — SIGNIFICANT CHANGE UP (ref 27–34)
MCHC RBC-ENTMCNC: 33.4 GM/DL — SIGNIFICANT CHANGE UP (ref 32–36)
MCV RBC AUTO: 96.6 FL — SIGNIFICANT CHANGE UP (ref 80–100)
NRBC # BLD: 0 /100 WBCS — SIGNIFICANT CHANGE UP (ref 0–0)
NRBC # FLD: 0 K/UL — SIGNIFICANT CHANGE UP (ref 0–0)
PLATELET # BLD AUTO: 231 K/UL — SIGNIFICANT CHANGE UP (ref 150–400)
POTASSIUM SERPL-MCNC: 3.3 MMOL/L — LOW (ref 3.5–5.3)
POTASSIUM SERPL-SCNC: 3.3 MMOL/L — LOW (ref 3.5–5.3)
RBC # BLD: 4.46 M/UL — SIGNIFICANT CHANGE UP (ref 3.8–5.2)
RBC # FLD: 12.1 % — SIGNIFICANT CHANGE UP (ref 10.3–14.5)
SODIUM SERPL-SCNC: 140 MMOL/L — SIGNIFICANT CHANGE UP (ref 135–145)
WBC # BLD: 5 K/UL — SIGNIFICANT CHANGE UP (ref 3.8–10.5)
WBC # FLD AUTO: 5 K/UL — SIGNIFICANT CHANGE UP (ref 3.8–10.5)

## 2022-10-19 PROCEDURE — 93010 ELECTROCARDIOGRAM REPORT: CPT

## 2022-10-19 NOTE — H&P PST ADULT - NSICDXFAMILYHX_GEN_ALL_CORE_FT
FAMILY HISTORY:  Father  Still living? Unknown  FH: stomach cancer, Age at diagnosis: Age Unknown    Mother  Still living? Unknown  FH: breast cancer, Age at diagnosis: Age Unknown  FH: HTN (hypertension), Age at diagnosis: Age Unknown

## 2022-10-19 NOTE — H&P PST ADULT - NSICDXPASTMEDICALHX_GEN_ALL_CORE_FT
PAST MEDICAL HISTORY:  2019 novel coronavirus disease (COVID-19) 2020, 8/2022    Acid reflux     Allergic asthma     H/O optic neuritis     Hyperlipidemia     Hypertension     Muscle spasm of back     S/P hemorrhoidectomy

## 2022-10-19 NOTE — H&P PST ADULT - PROBLEM SELECTOR PLAN 1
Pt scheduled for surgery on 11/4/22.  Pre-op instructions provided. Pt verbalized understanding.   Pt to take own medication for GI ppx.   Pt given detailed verbal and written instructions on chlorhexidine wash. Pt verbalized understanding with teachback.   Order placed for preop COVID PCR testing.

## 2022-10-19 NOTE — H&P PST ADULT - HISTORY OF PRESENT ILLNESS
62 year old female with hypercalcemia on bloodwork, found to have hyperparathyroidism presents today for presurgical evaluation for ... 62 year old female with hypercalcemia on bloodwork, found to have hyperparathyroidism presents today for presurgical evaluation for Parathyroidectomy with Parathyroid Hormone Assay.

## 2022-10-19 NOTE — H&P PST ADULT - NSICDXPASTSURGICALHX_GEN_ALL_CORE_FT
PAST SURGICAL HISTORY:  H/O tubal ligation     History of back surgery     S/P trigger finger release x2

## 2022-10-31 ENCOUNTER — NON-APPOINTMENT (OUTPATIENT)
Age: 62
End: 2022-10-31

## 2022-11-01 LAB — SARS-COV-2 RNA SPEC QL NAA+PROBE: SIGNIFICANT CHANGE UP

## 2022-11-04 ENCOUNTER — OUTPATIENT (OUTPATIENT)
Dept: OUTPATIENT SERVICES | Facility: HOSPITAL | Age: 62
LOS: 1 days | Discharge: HOME | End: 2022-11-04

## 2022-11-04 ENCOUNTER — APPOINTMENT (OUTPATIENT)
Dept: SURGERY | Facility: HOSPITAL | Age: 62
End: 2022-11-04

## 2022-11-04 ENCOUNTER — TRANSCRIPTION ENCOUNTER (OUTPATIENT)
Age: 62
End: 2022-11-04

## 2022-11-04 ENCOUNTER — RESULT REVIEW (OUTPATIENT)
Age: 62
End: 2022-11-04

## 2022-11-04 VITALS
WEIGHT: 162.04 LBS | SYSTOLIC BLOOD PRESSURE: 123 MMHG | RESPIRATION RATE: 16 BRPM | OXYGEN SATURATION: 96 % | DIASTOLIC BLOOD PRESSURE: 76 MMHG | HEART RATE: 71 BPM | TEMPERATURE: 98 F | HEIGHT: 63 IN

## 2022-11-04 VITALS
SYSTOLIC BLOOD PRESSURE: 106 MMHG | HEART RATE: 70 BPM | OXYGEN SATURATION: 98 % | DIASTOLIC BLOOD PRESSURE: 57 MMHG | RESPIRATION RATE: 17 BRPM

## 2022-11-04 DIAGNOSIS — Z98.51 TUBAL LIGATION STATUS: Chronic | ICD-10-CM

## 2022-11-04 DIAGNOSIS — Z98.89 OTHER SPECIFIED POSTPROCEDURAL STATES: Chronic | ICD-10-CM

## 2022-11-04 DIAGNOSIS — E21.0 PRIMARY HYPERPARATHYROIDISM: ICD-10-CM

## 2022-11-04 LAB
POTASSIUM BLDV-SCNC: 3.4 MMOL/L — LOW (ref 3.5–5.1)
PTH INTACT, INTRAOP SPECIMEN 2: SIGNIFICANT CHANGE UP
PTH INTACT, INTRAOP SPECIMEN 3: SIGNIFICANT CHANGE UP
PTH INTACT, INTRAOP SPECIMEN 4: SIGNIFICANT CHANGE UP
PTH INTACT, INTRAOP TIMING 2: SIGNIFICANT CHANGE UP
PTH INTACT, INTRAOP TIMING 3: SIGNIFICANT CHANGE UP
PTH INTACT, INTRAOP TIMING 4: SIGNIFICANT CHANGE UP
PTH INTACT, INTRAOPERATIVE 2: 64 PG/ML — SIGNIFICANT CHANGE UP (ref 15–65)
PTH INTACT, INTRAOPERATIVE 3: 42 PG/ML — SIGNIFICANT CHANGE UP (ref 15–65)
PTH INTACT, INTRAOPERATIVE 4: 29 PG/ML — SIGNIFICANT CHANGE UP (ref 15–65)
PTH-INTACT IO % DIF SERPL: 85 PG/ML — HIGH (ref 15–65)

## 2022-11-04 PROCEDURE — 88305 TISSUE EXAM BY PATHOLOGIST: CPT | Mod: 26

## 2022-11-04 PROCEDURE — 88331 PATH CONSLTJ SURG 1 BLK 1SPC: CPT | Mod: 26

## 2022-11-04 PROCEDURE — 60500 EXPLORE PARATHYROID GLANDS: CPT

## 2022-11-04 DEVICE — LIGATING CLIPS WECK HORIZON SMALL-WIDE (RED) 24: Type: IMPLANTABLE DEVICE | Status: FUNCTIONAL

## 2022-11-04 RX ORDER — OLMESARTAN MEDOXOMIL 5 MG/1
1 TABLET, FILM COATED ORAL
Qty: 0 | Refills: 0 | DISCHARGE

## 2022-11-04 RX ORDER — PANTOPRAZOLE SODIUM 20 MG/1
1 TABLET, DELAYED RELEASE ORAL
Qty: 0 | Refills: 0 | DISCHARGE

## 2022-11-04 RX ORDER — ACETAMINOPHEN 500 MG
650 TABLET ORAL EVERY 6 HOURS
Refills: 0 | Status: DISCONTINUED | OUTPATIENT
Start: 2022-11-04 | End: 2022-11-18

## 2022-11-04 RX ORDER — ALBUTEROL 90 UG/1
2 AEROSOL, METERED ORAL
Qty: 0 | Refills: 0 | DISCHARGE

## 2022-11-04 RX ORDER — ROSUVASTATIN CALCIUM 5 MG/1
1 TABLET ORAL
Qty: 0 | Refills: 0 | DISCHARGE

## 2022-11-04 RX ORDER — FREMANEZUMAB-VFRM 225 MG/1.5ML
0 INJECTION SUBCUTANEOUS
Qty: 0 | Refills: 0 | DISCHARGE

## 2022-11-04 RX ORDER — GABAPENTIN 400 MG/1
1 CAPSULE ORAL
Qty: 0 | Refills: 0 | DISCHARGE

## 2022-11-04 RX ORDER — BENZOCAINE AND MENTHOL 5; 1 G/100ML; G/100ML
1 LIQUID ORAL
Refills: 0 | Status: DISCONTINUED | OUTPATIENT
Start: 2022-11-04 | End: 2022-11-18

## 2022-11-04 RX ORDER — DICLOFENAC SODIUM 75 MG/1
1 TABLET, DELAYED RELEASE ORAL
Qty: 0 | Refills: 0 | DISCHARGE

## 2022-11-04 RX ORDER — SODIUM CHLORIDE 9 MG/ML
1000 INJECTION, SOLUTION INTRAVENOUS
Refills: 0 | Status: DISCONTINUED | OUTPATIENT
Start: 2022-11-04 | End: 2022-11-18

## 2022-11-04 RX ORDER — CALCIUM CARBONATE 500(1250)
1 TABLET ORAL EVERY 6 HOURS
Refills: 0 | Status: DISCONTINUED | OUTPATIENT
Start: 2022-11-04 | End: 2022-11-18

## 2022-11-04 RX ORDER — MONTELUKAST 4 MG/1
1 TABLET, CHEWABLE ORAL
Qty: 0 | Refills: 0 | DISCHARGE

## 2022-11-04 RX ORDER — ACETAMINOPHEN 500 MG
2 TABLET ORAL
Qty: 0 | Refills: 0 | DISCHARGE
Start: 2022-11-04

## 2022-11-04 RX ORDER — AMLODIPINE BESYLATE 2.5 MG/1
1 TABLET ORAL
Qty: 0 | Refills: 0 | DISCHARGE

## 2022-11-04 RX ORDER — DESLORATADINE 5 MG/1
1 TABLET, FILM COATED ORAL
Qty: 0 | Refills: 0 | DISCHARGE

## 2022-11-04 RX ORDER — CALCIUM CARBONATE 500(1250)
1 TABLET ORAL
Qty: 0 | Refills: 0 | DISCHARGE
Start: 2022-11-04

## 2022-11-04 RX ADMIN — Medication 1 TABLET(S): at 11:34

## 2022-11-04 NOTE — ASU DISCHARGE PLAN (ADULT/PEDIATRIC) - FOLLOW UP APPOINTMENTS
may also call Recovery Room (PACU) 24/7 @ [phone]/[facility], Women[facility]

## 2022-11-04 NOTE — ASU DISCHARGE PLAN (ADULT/PEDIATRIC) - CARE PROVIDER_API CALL
Olu Jones)  Plastic Surgery  [address] [place] [address] [address]  [place] [address]  Phone: [phone]  Fax: [phone]  Follow Up Time:

## 2022-11-04 NOTE — BRIEF OPERATIVE NOTE - NSICDXBRIEFPROCEDURE_GEN_ALL_CORE_FT
PROCEDURES:  Parathyroidectomy, with intraoperative PTH measurement 04-Nov-2022 10:39:42  Yomi Lovell Sub

## 2022-11-10 LAB — SURGICAL PATHOLOGY STUDY: SIGNIFICANT CHANGE UP

## 2022-11-17 ENCOUNTER — APPOINTMENT (OUTPATIENT)
Dept: SURGERY | Facility: CLINIC | Age: 62
End: 2022-11-17

## 2022-11-17 PROBLEM — K21.9 GASTRO-ESOPHAGEAL REFLUX DISEASE WITHOUT ESOPHAGITIS: Chronic | Status: ACTIVE | Noted: 2022-10-19

## 2022-11-17 PROBLEM — U07.1 COVID-19: Chronic | Status: ACTIVE | Noted: 2022-10-19

## 2022-11-17 PROBLEM — J45.909 UNSPECIFIED ASTHMA, UNCOMPLICATED: Chronic | Status: ACTIVE | Noted: 2022-10-19

## 2022-11-17 PROBLEM — Z86.69 PERSONAL HISTORY OF OTHER DISEASES OF THE NERVOUS SYSTEM AND SENSE ORGANS: Chronic | Status: ACTIVE | Noted: 2022-10-19

## 2022-11-17 PROBLEM — Z98.890 OTHER SPECIFIED POSTPROCEDURAL STATES: Chronic | Status: ACTIVE | Noted: 2022-10-19

## 2022-11-17 PROBLEM — E78.5 HYPERLIPIDEMIA, UNSPECIFIED: Chronic | Status: ACTIVE | Noted: 2022-10-19

## 2022-11-17 PROCEDURE — 36415 COLL VENOUS BLD VENIPUNCTURE: CPT

## 2022-11-17 PROCEDURE — 99024 POSTOP FOLLOW-UP VISIT: CPT

## 2022-11-17 NOTE — PHYSICAL EXAM
[de-identified] : well healed scar [Midline] : located in midline position [Normal] : orientation to person, place, and time: normal

## 2022-11-17 NOTE — ASSESSMENT
[FreeTextEntry1] : s/p parathyroidectomy\par daily care\par path discussed\par blood work in office\par f/u 3 months

## 2022-11-18 ENCOUNTER — NON-APPOINTMENT (OUTPATIENT)
Age: 62
End: 2022-11-18

## 2022-11-18 LAB
25(OH)D3 SERPL-MCNC: 19.3 NG/ML
CALCIUM SERPL-MCNC: 10.2 MG/DL
CALCIUM SERPL-MCNC: 10.2 MG/DL
PARATHYROID HORMONE INTACT: 27 PG/ML

## 2022-11-23 ENCOUNTER — NON-APPOINTMENT (OUTPATIENT)
Age: 62
End: 2022-11-23

## 2022-11-29 NOTE — ED ADULT NURSE NOTE - NSFALLRSKASSESSDT_ED_ALL_ED
06-Oct-2018 14:20 Low Dose Naltrexone Pregnancy And Lactation Text: Naltrexone is pregnancy category C.  There have been no adequate and well-controlled studies in pregnant women.  It should be used in pregnancy only if the potential benefit justifies the potential risk to the fetus.   Limited data indicates that naltrexone is minimally excreted into breastmilk.

## 2023-02-16 ENCOUNTER — APPOINTMENT (OUTPATIENT)
Dept: SURGERY | Facility: CLINIC | Age: 63
End: 2023-02-16

## 2023-03-16 ENCOUNTER — APPOINTMENT (OUTPATIENT)
Dept: SURGERY | Facility: CLINIC | Age: 63
End: 2023-03-16
Payer: COMMERCIAL

## 2023-03-16 DIAGNOSIS — E21.0 PRIMARY HYPERPARATHYROIDISM: ICD-10-CM

## 2023-03-16 PROCEDURE — 36415 COLL VENOUS BLD VENIPUNCTURE: CPT

## 2023-03-16 PROCEDURE — 99214 OFFICE O/P EST MOD 30 MIN: CPT | Mod: 25

## 2023-03-16 NOTE — HISTORY OF PRESENT ILLNESS
[de-identified] : Pt 4 months s/p parathyroidectomy doing well c/o weight gain and cold intolerance since surgery denies numbness or bone pain

## 2023-03-16 NOTE — PHYSICAL EXAM
[de-identified] : well healed scar [Midline] : located in midline position [Normal] : orientation to person, place, and time: normal

## 2023-03-17 ENCOUNTER — NON-APPOINTMENT (OUTPATIENT)
Age: 63
End: 2023-03-17

## 2023-03-17 LAB
25(OH)D3 SERPL-MCNC: 23.5 NG/ML
CALCIUM SERPL-MCNC: 9.2 MG/DL
CALCIUM SERPL-MCNC: 9.2 MG/DL
PARATHYROID HORMONE INTACT: 65 PG/ML
T3 SERPL-MCNC: 104 NG/DL
T4 FREE SERPL-MCNC: 1.1 NG/DL
THYROGLOB AB SERPL-ACNC: <20 IU/ML
THYROPEROXIDASE AB SERPL IA-ACNC: 18.4 IU/ML
TSH SERPL-ACNC: 2.21 UIU/ML

## 2023-03-20 ENCOUNTER — NON-APPOINTMENT (OUTPATIENT)
Age: 63
End: 2023-03-20

## 2023-04-25 ENCOUNTER — APPOINTMENT (OUTPATIENT)
Dept: OBGYN | Facility: CLINIC | Age: 63
End: 2023-04-25
Payer: COMMERCIAL

## 2023-04-25 VITALS
BODY MASS INDEX: 29.41 KG/M2 | HEIGHT: 63 IN | WEIGHT: 166 LBS | SYSTOLIC BLOOD PRESSURE: 135 MMHG | HEART RATE: 67 BPM | DIASTOLIC BLOOD PRESSURE: 87 MMHG

## 2023-04-25 DIAGNOSIS — N89.8 OTHER SPECIFIED NONINFLAMMATORY DISORDERS OF VAGINA: ICD-10-CM

## 2023-04-25 DIAGNOSIS — Z01.419 ENCOUNTER FOR GYNECOLOGICAL EXAMINATION (GENERAL) (ROUTINE) W/OUT ABNORMAL FINDINGS: ICD-10-CM

## 2023-04-25 PROCEDURE — 99386 PREV VISIT NEW AGE 40-64: CPT

## 2023-04-25 PROCEDURE — 99213 OFFICE O/P EST LOW 20 MIN: CPT | Mod: 25

## 2023-04-25 NOTE — DISCUSSION/SUMMARY
[FreeTextEntry1] : Annual gyn [facility]\par possible vaginal wall cyst\par referred for TV pelvic sonogram

## 2023-04-25 NOTE — PROCEDURE
[Cervical Pap Smear] : cervical Pap smear [Liquid Base] : liquid base [Tolerated Well] : the patient tolerated the procedure well [No Complications] : there were no complications [de-identified] : hr hpv

## 2023-04-25 NOTE — HISTORY OF PRESENT ILLNESS
[FreeTextEntry1] : annual gyn [employer]\par pt had last gyn exam 4 years ago\par since that time has had known inner anterior vaginal wall cyst- which in the past 4 years she has noticed has grown in size\par pt does not feel it normally, only feels it when she washes inside the vagina

## 2023-04-25 NOTE — PHYSICAL EXAM
[Chaperone Present] : A chaperone was present in the examining room during all aspects of the physical examination [Appropriately responsive] : appropriately responsive [Alert] : alert [No Acute Distress] : no acute distress [No Lymphadenopathy] : no lymphadenopathy [Regular Rate Rhythm] : regular rate rhythm [Soft] : soft [Non-tender] : non-tender [Non-distended] : non-distended [No Mass] : no mass [Oriented x3] : oriented x3 [Examination Of The Breasts] : a normal appearance [No Masses] : no breast masses were palpable [Labia Majora] : normal [Labia Minora] : normal [Normal] : normal [Uterine Adnexae] : normal [FreeTextEntry4] : with small midway approx small pedunculated fibroepithelial polyp or cyst on anterior vaginal wall, only palpated, not able to be seen

## 2023-04-27 LAB — HPV HIGH+LOW RISK DNA PNL CVX: NOT DETECTED

## 2023-04-28 ENCOUNTER — APPOINTMENT (OUTPATIENT)
Dept: OBGYN | Facility: CLINIC | Age: 63
End: 2023-04-28
Payer: COMMERCIAL

## 2023-04-28 ENCOUNTER — ASOB RESULT (OUTPATIENT)
Age: 63
End: 2023-04-28

## 2023-04-28 PROCEDURE — 76830 TRANSVAGINAL US NON-OB: CPT

## 2023-05-04 LAB — CYTOLOGY CVX/VAG DOC THIN PREP: ABNORMAL

## 2023-05-05 ENCOUNTER — TRANSCRIPTION ENCOUNTER (OUTPATIENT)
Age: 63
End: 2023-05-05

## 2023-05-10 NOTE — ASU PATIENT PROFILE, ADULT - NS TRANSFER DENTURES

## 2023-09-21 ENCOUNTER — APPOINTMENT (OUTPATIENT)
Dept: SURGERY | Facility: CLINIC | Age: 63
End: 2023-09-21

## 2024-02-26 NOTE — ED PROVIDER NOTE - CARE PLAN
[FreeTextEntry1] : Encouraged to choose foods that decrease cravings and increase fullness. Increase lean protein intake, including plant proteins. The importance of hydration was emphasized.  The importance of eating slowly was emphasized. The patient is committed to learning and incorporating diet and exercise modifications to optimize success after bariatric surgery.  F/u 1 month    Principal Discharge DX:	Fever  Assessment and plan of treatment:	1.  Stay well hydrated  2.  Continue current home medications  3.  Follow up with your Neurosurgeon as soon as possible.  4.  Return to the ER for worsening pain, persistent fevers/chills, headache or any other concerning symptoms

## 2025-01-08 NOTE — ASU DISCHARGE PLAN (ADULT/PEDIATRIC) - FREQUENT HAND WASHING PREVENTS THE SPREAD OF INFECTION.
Statement Selected [Routine Follow-Up] : a routine follow-up visit for [Parents] : parents [Medical Records] : medical records [To Food] : allergy to food [Eczema] : eczema

## (undated) DEVICE — DRSG BENZOIN 0.6CC

## (undated) DEVICE — PACK HEAD & NECK

## (undated) DEVICE — GLV 7 PROTEXIS (WHITE)

## (undated) DEVICE — SUT VICRYL 3-0 18" TIES UNDYED

## (undated) DEVICE — PROTECTOR HEEL / ELBOW FLUFFY

## (undated) DEVICE — SUT VICRYL 2-0 18" TIES UNDYED

## (undated) DEVICE — DRAPE LAPAROTOMY TRANSVERSE

## (undated) DEVICE — DRAPE 3/4 SHEET 52X76"

## (undated) DEVICE — SUT CHROMIC 3-0 27" SH

## (undated) DEVICE — ELCTR GROUNDING PAD ADULT COVIDIEN

## (undated) DEVICE — LABELS BLANK W PEN

## (undated) DEVICE — SUT MONOCRYL 4-0 27" PS-2 UNDYED

## (undated) DEVICE — SOL IRR POUR H2O 500ML

## (undated) DEVICE — NDL HYPO SAFE 25G X 5/8" (ORANGE)

## (undated) DEVICE — VENODYNE/SCD SLEEVE CALF MEDIUM

## (undated) DEVICE — CANISTER DISPOSABLE THIN WALL 3000CC

## (undated) DEVICE — BIPOLAR FORCEP KIRWAN CUSHING 6" X 1MM W 12FT CORD (GREEN)